# Patient Record
Sex: FEMALE | Race: BLACK OR AFRICAN AMERICAN | NOT HISPANIC OR LATINO | URBAN - METROPOLITAN AREA
[De-identification: names, ages, dates, MRNs, and addresses within clinical notes are randomized per-mention and may not be internally consistent; named-entity substitution may affect disease eponyms.]

---

## 2019-11-06 PROBLEM — 429969003 FAMILY HISTORY OF POLYP OF COLON: Status: ACTIVE | Noted: 2019-11-06

## 2019-11-06 PROBLEM — 428283002 HISTORY OF POLYP OF COLON (SITUATION): Status: ACTIVE | Noted: 2019-11-06

## 2021-08-26 ENCOUNTER — OFFICE VISIT (OUTPATIENT)
Dept: URBAN - METROPOLITAN AREA CLINIC 29 | Facility: CLINIC | Age: 76
End: 2021-08-26

## 2021-08-28 ENCOUNTER — LAB OUTSIDE AN ENCOUNTER (OUTPATIENT)
Dept: URBAN - METROPOLITAN AREA CLINIC 121 | Facility: CLINIC | Age: 76
End: 2021-08-28

## 2021-08-28 LAB — ZZ-GE-UNK: NOT DETECTED

## 2021-09-16 ENCOUNTER — OFFICE VISIT (OUTPATIENT)
Dept: URBAN - METROPOLITAN AREA CLINIC 29 | Facility: CLINIC | Age: 76
End: 2021-09-16

## 2021-10-18 ENCOUNTER — OFFICE VISIT (OUTPATIENT)
Dept: URBAN - METROPOLITAN AREA SURGERY CENTER 7 | Facility: SURGERY CENTER | Age: 76
End: 2021-10-18

## 2021-11-16 ENCOUNTER — OFFICE VISIT (OUTPATIENT)
Dept: URBAN - METROPOLITAN AREA CLINIC 29 | Facility: CLINIC | Age: 76
End: 2021-11-16

## 2021-11-16 PROBLEM — 238131007 OVERWEIGHT: Status: ACTIVE | Noted: 2021-11-16

## 2022-02-10 ENCOUNTER — OFFICE VISIT (OUTPATIENT)
Dept: URBAN - METROPOLITAN AREA TELEHEALTH 2 | Facility: TELEHEALTH | Age: 77
End: 2022-02-10

## 2022-04-30 ENCOUNTER — TELEPHONE ENCOUNTER (OUTPATIENT)
Dept: URBAN - METROPOLITAN AREA CLINIC 121 | Facility: CLINIC | Age: 77
End: 2022-04-30

## 2022-04-30 RX ORDER — PANTOPRAZOLE SODIUM 40 MG/1
TAKE 1 TABLET BY MOUTH ONCE A DAY TABLET, DELAYED RELEASE ORAL
OUTPATIENT
Start: 2021-08-26 | End: 2022-04-23

## 2022-04-30 RX ORDER — PRUCALOPRIDE 2 MG/1
TAKE 1 TABLET BY MOUTH ONCE A DAY TABLET, FILM COATED ORAL
OUTPATIENT
Start: 2021-09-16 | End: 2022-01-14

## 2022-04-30 RX ORDER — LINACLOTIDE 72 UG/1
1 CAPSULE PO QAM 30MIN BEFORE BREAKFAT CAPSULE, GELATIN COATED ORAL
OUTPATIENT
Start: 2020-04-16

## 2022-05-01 ENCOUNTER — TELEPHONE ENCOUNTER (OUTPATIENT)
Dept: URBAN - METROPOLITAN AREA CLINIC 121 | Facility: CLINIC | Age: 77
End: 2022-05-01

## 2022-05-01 RX ORDER — SODIUM SULFATE, POTASSIUM SULFATE, MAGNESIUM SULFATE 17.5; 3.13; 1.6 G/ML; G/ML; G/ML
MIX AND USE AS DIRECTED. MAY SUB FOR GOLYTELY SOLUTION, CONCENTRATE ORAL
Status: ACTIVE | COMMUNITY
Start: 2019-11-06

## 2022-05-01 RX ORDER — LATANOPROST/PF 0.005 %
DROPS OPHTHALMIC (EYE)
Status: ACTIVE | COMMUNITY
Start: 2019-01-16

## 2022-05-01 RX ORDER — TIMOLOL MALEATE 5 MG/1
TABLET ORAL
Status: ACTIVE | COMMUNITY
Start: 2019-01-16

## 2022-05-01 RX ORDER — LOSARTAN POTASSIUM 50 MG/1
TABLET, FILM COATED ORAL
Status: ACTIVE | COMMUNITY

## 2022-05-01 RX ORDER — LACTULOSE 10 G/15ML
TAKE 2 TO 3 TEASPOON BY MOUTH THREE TIMES A DAY SOLUTION ORAL
Status: ACTIVE | COMMUNITY
Start: 2022-02-10 | End: 2022-06-10

## 2022-05-01 RX ORDER — ASCORBIC ACID 500 MG
TABLET,CHEWABLE ORAL
Status: ACTIVE | COMMUNITY
Start: 2015-03-24

## 2022-05-01 RX ORDER — ASPIRIN 81 MG/1
TABLET ORAL
Status: ACTIVE | COMMUNITY
Start: 2015-03-24

## 2022-05-01 RX ORDER — CETIRIZINE HYDROCHLORIDE 10 MG/1
TABLET, FILM COATED ORAL
Status: ACTIVE | COMMUNITY
Start: 2015-03-24

## 2022-05-01 RX ORDER — ATORVASTATIN CALCIUM 10 MG/1
TABLET, FILM COATED ORAL
Status: ACTIVE | COMMUNITY
Start: 2019-01-16

## 2022-05-01 RX ORDER — LINACLOTIDE 72 UG/1
TAKE 1 CAPSULE BY MOUTH EVERY MORNING 30 MINUTES BEFORE BREAKFAST CAPSULE, GELATIN COATED ORAL
Status: ACTIVE | COMMUNITY
Start: 2021-06-07

## 2022-05-01 RX ORDER — DILTIAZEM HYDROCHLORIDE 30 MG/1
TABLET ORAL
Status: ACTIVE | COMMUNITY
Start: 2015-03-24

## 2022-05-01 RX ORDER — POTASSIUM CHLORIDE 600 MG/1
TABLET, FILM COATED, EXTENDED RELEASE ORAL
Status: ACTIVE | COMMUNITY
Start: 2015-03-24

## 2022-05-01 RX ORDER — DORZOLAMIDE HCL/PF 2 %
DROPS OPHTHALMIC (EYE)
Status: ACTIVE | COMMUNITY
Start: 2019-01-16

## 2022-05-01 RX ORDER — FAMOTIDINE 20 MG/1
TABLET ORAL
Status: ACTIVE | COMMUNITY

## 2022-05-01 RX ORDER — CHROMIUM 200 MCG
TABLET ORAL
Status: ACTIVE | COMMUNITY
Start: 2015-03-24

## 2022-06-06 ENCOUNTER — OFFICE VISIT (OUTPATIENT)
Dept: URBAN - METROPOLITAN AREA CLINIC 27 | Facility: CLINIC | Age: 77
End: 2022-06-06
Payer: MEDICARE

## 2022-06-06 DIAGNOSIS — R63.0 ANOREXIA: ICD-10-CM

## 2022-06-06 DIAGNOSIS — R63.4 WEIGHT LOSS: ICD-10-CM

## 2022-06-06 PROCEDURE — 99213 OFFICE O/P EST LOW 20 MIN: CPT | Performed by: INTERNAL MEDICINE

## 2022-06-06 PROCEDURE — 99243 OFF/OP CNSLTJ NEW/EST LOW 30: CPT | Performed by: INTERNAL MEDICINE

## 2022-06-06 NOTE — HPI-TODAY'S VISIT:
The patient was referred by Anahi Carrasco PA-C for unexplained weight loss. A copy of this document is being forwarded to the referring provider. Ms. Cohen reports a 22 pound weight loss over the past year. She has decreased appetite but denies early satiety. She has occasional nausea but denies vomiting, abd pain, diarrhea, constipation. Her colonoscopy last year was significant for a polyp and first degree hemorrhoids. Her last TSH was 0.971. EGD 10/21 was significant for an irregular z-line. Otherwise normal.

## 2022-06-10 ENCOUNTER — CLAIMS CREATED FROM THE CLAIM WINDOW (OUTPATIENT)
Dept: URBAN - METROPOLITAN AREA CLINIC 27 | Facility: CLINIC | Age: 77
End: 2022-06-10
Payer: MEDICARE

## 2022-06-10 ENCOUNTER — WEB ENCOUNTER (OUTPATIENT)
Dept: URBAN - METROPOLITAN AREA CLINIC 27 | Facility: CLINIC | Age: 77
End: 2022-06-10

## 2022-06-10 VITALS
TEMPERATURE: 96.8 F | SYSTOLIC BLOOD PRESSURE: 146 MMHG | DIASTOLIC BLOOD PRESSURE: 76 MMHG | BODY MASS INDEX: 27.99 KG/M2 | HEART RATE: 76 BPM | WEIGHT: 168 LBS | HEIGHT: 65 IN | RESPIRATION RATE: 17 BRPM

## 2022-06-10 DIAGNOSIS — R63.4 WEIGHT LOSS: ICD-10-CM

## 2022-06-10 DIAGNOSIS — R63.0 ANOREXIA: ICD-10-CM

## 2022-06-10 PROCEDURE — 99243 OFF/OP CNSLTJ NEW/EST LOW 30: CPT | Performed by: PHYSICIAN ASSISTANT

## 2022-06-10 PROCEDURE — 99213 OFFICE O/P EST LOW 20 MIN: CPT | Performed by: PHYSICIAN ASSISTANT

## 2022-06-10 RX ORDER — DILTIAZEM HYDROCHLORIDE 30 MG/1
TABLET ORAL
Status: ACTIVE | COMMUNITY
Start: 2015-03-24

## 2022-06-10 RX ORDER — ASPIRIN 81 MG/1
TABLET ORAL
Status: ACTIVE | COMMUNITY
Start: 2015-03-24

## 2022-06-10 RX ORDER — TIMOLOL MALEATE 5 MG/1
TABLET ORAL
Status: ACTIVE | COMMUNITY
Start: 2019-01-16

## 2022-06-10 RX ORDER — SODIUM SULFATE, POTASSIUM SULFATE, MAGNESIUM SULFATE 17.5; 3.13; 1.6 G/ML; G/ML; G/ML
MIX AND USE AS DIRECTED. MAY SUB FOR GOLYTELY SOLUTION, CONCENTRATE ORAL
Status: ACTIVE | COMMUNITY
Start: 2019-11-06

## 2022-06-10 RX ORDER — ATORVASTATIN CALCIUM 10 MG/1
TABLET, FILM COATED ORAL
Status: ACTIVE | COMMUNITY
Start: 2019-01-16

## 2022-06-10 RX ORDER — ASCORBIC ACID 500 MG
TABLET,CHEWABLE ORAL
Status: ACTIVE | COMMUNITY
Start: 2015-03-24

## 2022-06-10 RX ORDER — DORZOLAMIDE HCL/PF 2 %
DROPS OPHTHALMIC (EYE)
Status: ACTIVE | COMMUNITY
Start: 2019-01-16

## 2022-06-10 RX ORDER — LOSARTAN POTASSIUM 50 MG/1
TABLET, FILM COATED ORAL
Status: ACTIVE | COMMUNITY

## 2022-06-10 RX ORDER — LATANOPROST/PF 0.005 %
DROPS OPHTHALMIC (EYE)
Status: ACTIVE | COMMUNITY
Start: 2019-01-16

## 2022-06-10 RX ORDER — CETIRIZINE HYDROCHLORIDE 10 MG/1
TABLET, FILM COATED ORAL
Status: ACTIVE | COMMUNITY
Start: 2015-03-24

## 2022-06-10 RX ORDER — CHROMIUM 200 MCG
TABLET ORAL
Status: ACTIVE | COMMUNITY
Start: 2015-03-24

## 2022-06-10 RX ORDER — LACTULOSE 10 G/15ML
TAKE 2 TO 3 TEASPOON BY MOUTH THREE TIMES A DAY SOLUTION ORAL
Status: ACTIVE | COMMUNITY
Start: 2022-02-10 | End: 2022-06-10

## 2022-06-10 RX ORDER — FAMOTIDINE 20 MG/1
TABLET ORAL
Status: ACTIVE | COMMUNITY

## 2022-06-10 RX ORDER — POTASSIUM CHLORIDE 600 MG/1
TABLET, FILM COATED, EXTENDED RELEASE ORAL
Status: ACTIVE | COMMUNITY
Start: 2015-03-24

## 2022-06-10 RX ORDER — LINACLOTIDE 72 UG/1
TAKE 1 CAPSULE BY MOUTH EVERY MORNING 30 MINUTES BEFORE BREAKFAST CAPSULE, GELATIN COATED ORAL
Status: ACTIVE | COMMUNITY
Start: 2021-06-07

## 2022-06-10 NOTE — HPI-TODAY'S VISIT:
The patient was referred by Anahi Carrasco PA-C for unexplained weight loss. A copy of this document is being forwarded to the referring provider. Ms. Cohen reports a 22 pound weight loss over the past year. She reports a decreased appetite but denies early satiety. She has occasional nausea but denies vomiting, abdominal pain, diarrhea, constipation. Her colonoscopy last year was significant for a colon polyp and hemorrhoids. Her EGD last year was normal except for an irregular Z-line. Her recent TSH was 0.971.

## 2022-06-12 PROBLEM — 79890006: Status: ACTIVE | Noted: 2022-06-12

## 2022-08-10 ENCOUNTER — ERX REFILL RESPONSE (OUTPATIENT)
Dept: URBAN - METROPOLITAN AREA CLINIC 27 | Facility: CLINIC | Age: 77
End: 2022-08-10

## 2022-08-10 RX ORDER — LACTULOSE 10 G/15ML
TAKE 10-15 ML BY MOUTH THREE TIMES DAILY SOLUTION ORAL
Qty: 600 MILLILITER | Refills: 0 | OUTPATIENT

## 2022-08-12 ENCOUNTER — OFFICE VISIT (OUTPATIENT)
Dept: URBAN - METROPOLITAN AREA CLINIC 27 | Facility: CLINIC | Age: 77
End: 2022-08-12
Payer: MEDICARE

## 2022-08-12 VITALS
HEART RATE: 65 BPM | BODY MASS INDEX: 28.32 KG/M2 | WEIGHT: 170 LBS | TEMPERATURE: 96.9 F | SYSTOLIC BLOOD PRESSURE: 125 MMHG | HEIGHT: 65 IN | DIASTOLIC BLOOD PRESSURE: 69 MMHG

## 2022-08-12 DIAGNOSIS — K59.04 CHRONIC IDIOPATHIC CONSTIPATION: ICD-10-CM

## 2022-08-12 DIAGNOSIS — R63.4 WEIGHT LOSS: ICD-10-CM

## 2022-08-12 PROCEDURE — 99213 OFFICE O/P EST LOW 20 MIN: CPT | Performed by: INTERNAL MEDICINE

## 2022-08-12 RX ORDER — TIMOLOL MALEATE 5 MG/1
TABLET ORAL
Status: ACTIVE | COMMUNITY
Start: 2019-01-16

## 2022-08-12 RX ORDER — ASCORBIC ACID 500 MG
TABLET,CHEWABLE ORAL
Status: ACTIVE | COMMUNITY
Start: 2015-03-24

## 2022-08-12 RX ORDER — POTASSIUM CHLORIDE 600 MG/1
TABLET, FILM COATED, EXTENDED RELEASE ORAL
Status: ACTIVE | COMMUNITY
Start: 2015-03-24

## 2022-08-12 RX ORDER — ASPIRIN 81 MG/1
TABLET ORAL
Status: ACTIVE | COMMUNITY
Start: 2015-03-24

## 2022-08-12 RX ORDER — SODIUM SULFATE, POTASSIUM SULFATE, MAGNESIUM SULFATE 17.5; 3.13; 1.6 G/ML; G/ML; G/ML
MIX AND USE AS DIRECTED. MAY SUB FOR GOLYTELY SOLUTION, CONCENTRATE ORAL
Status: ACTIVE | COMMUNITY
Start: 2019-11-06

## 2022-08-12 RX ORDER — LATANOPROST/PF 0.005 %
DROPS OPHTHALMIC (EYE)
Status: ACTIVE | COMMUNITY
Start: 2019-01-16

## 2022-08-12 RX ORDER — DILTIAZEM HYDROCHLORIDE 30 MG/1
TABLET ORAL
Status: ACTIVE | COMMUNITY
Start: 2015-03-24

## 2022-08-12 RX ORDER — LACTULOSE 10 G/15ML
TAKE 10-15 ML BY MOUTH THREE TIMES DAILY SOLUTION ORAL
Qty: 600 MILLILITER | Refills: 0 | Status: ACTIVE | COMMUNITY

## 2022-08-12 RX ORDER — FAMOTIDINE 20 MG/1
TABLET ORAL
Status: ACTIVE | COMMUNITY

## 2022-08-12 RX ORDER — LINACLOTIDE 72 UG/1
TAKE 1 CAPSULE BY MOUTH EVERY MORNING 30 MINUTES BEFORE BREAKFAST CAPSULE, GELATIN COATED ORAL
Status: ACTIVE | COMMUNITY
Start: 2021-06-07

## 2022-08-12 RX ORDER — DORZOLAMIDE HCL/PF 2 %
DROPS OPHTHALMIC (EYE)
Status: ACTIVE | COMMUNITY
Start: 2019-01-16

## 2022-08-12 RX ORDER — LOSARTAN POTASSIUM 50 MG/1
TABLET, FILM COATED ORAL
Status: ACTIVE | COMMUNITY

## 2022-08-12 RX ORDER — ATORVASTATIN CALCIUM 10 MG/1
TABLET, FILM COATED ORAL
Status: ACTIVE | COMMUNITY
Start: 2019-01-16

## 2022-08-12 RX ORDER — CHROMIUM 200 MCG
TABLET ORAL
Status: ACTIVE | COMMUNITY
Start: 2015-03-24

## 2022-08-12 RX ORDER — CETIRIZINE HYDROCHLORIDE 10 MG/1
TABLET, FILM COATED ORAL
Status: ACTIVE | COMMUNITY
Start: 2015-03-24

## 2022-08-12 NOTE — HPI-TODAY'S VISIT:
Mrs. Cohen presents today for follow-up of her weight loss.  During her last visit, she was supposed to get a CT scan of the chest pelvis and abdomen for evaluation of her weight loss.  However, she reports that there was a shortage of IV contrast and that she did not have the study performed.  I also suspected that her weight loss may have been attributed to anxiety or depression and recommended that she follow-up with her primary care physician for that.  She has been prescribed anxiolytic medication and feels somewhat better.  She actually denies any significant medical issues.  Her constipation is being controlled alternating lactulose with MiraLAX.

## 2022-08-16 ENCOUNTER — LAB OUTSIDE AN ENCOUNTER (OUTPATIENT)
Dept: URBAN - METROPOLITAN AREA CLINIC 27 | Facility: CLINIC | Age: 77
End: 2022-08-16

## 2022-11-03 ENCOUNTER — OFFICE VISIT (OUTPATIENT)
Dept: URBAN - METROPOLITAN AREA CLINIC 29 | Facility: CLINIC | Age: 77
End: 2022-11-03
Payer: MEDICARE

## 2022-11-03 VITALS
BODY MASS INDEX: 28.99 KG/M2 | HEART RATE: 68 BPM | DIASTOLIC BLOOD PRESSURE: 85 MMHG | SYSTOLIC BLOOD PRESSURE: 138 MMHG | TEMPERATURE: 96.9 F | WEIGHT: 174 LBS | HEIGHT: 65 IN

## 2022-11-03 DIAGNOSIS — R63.4 WEIGHT LOSS: ICD-10-CM

## 2022-11-03 DIAGNOSIS — K59.04 CHRONIC IDIOPATHIC CONSTIPATION: ICD-10-CM

## 2022-11-03 PROCEDURE — 99212 OFFICE O/P EST SF 10 MIN: CPT | Performed by: INTERNAL MEDICINE

## 2022-11-03 RX ORDER — ASPIRIN 81 MG/1
TABLET ORAL
Status: ACTIVE | COMMUNITY
Start: 2015-03-24

## 2022-11-03 RX ORDER — ATORVASTATIN CALCIUM 10 MG/1
TABLET, FILM COATED ORAL
Status: ACTIVE | COMMUNITY
Start: 2019-01-16

## 2022-11-03 RX ORDER — SODIUM SULFATE, POTASSIUM SULFATE, MAGNESIUM SULFATE 17.5; 3.13; 1.6 G/ML; G/ML; G/ML
MIX AND USE AS DIRECTED. MAY SUB FOR GOLYTELY SOLUTION, CONCENTRATE ORAL
Status: ACTIVE | COMMUNITY
Start: 2019-11-06

## 2022-11-03 RX ORDER — TIMOLOL MALEATE 5 MG/1
TABLET ORAL
Status: ACTIVE | COMMUNITY
Start: 2019-01-16

## 2022-11-03 RX ORDER — CETIRIZINE HYDROCHLORIDE 10 MG/1
TABLET, FILM COATED ORAL
Status: ACTIVE | COMMUNITY
Start: 2015-03-24

## 2022-11-03 RX ORDER — LOSARTAN POTASSIUM 50 MG/1
TABLET, FILM COATED ORAL
Status: ACTIVE | COMMUNITY

## 2022-11-03 RX ORDER — ASCORBIC ACID 500 MG
TABLET,CHEWABLE ORAL
Status: ACTIVE | COMMUNITY
Start: 2015-03-24

## 2022-11-03 RX ORDER — DILTIAZEM HYDROCHLORIDE 30 MG/1
TABLET ORAL
Status: ACTIVE | COMMUNITY
Start: 2015-03-24

## 2022-11-03 RX ORDER — POTASSIUM CHLORIDE 600 MG/1
TABLET, FILM COATED, EXTENDED RELEASE ORAL
Status: ACTIVE | COMMUNITY
Start: 2015-03-24

## 2022-11-03 RX ORDER — FAMOTIDINE 20 MG/1
TABLET ORAL
Status: ACTIVE | COMMUNITY

## 2022-11-03 RX ORDER — CHROMIUM 200 MCG
TABLET ORAL
Status: ACTIVE | COMMUNITY
Start: 2015-03-24

## 2022-11-03 RX ORDER — LINACLOTIDE 72 UG/1
TAKE 1 CAPSULE BY MOUTH EVERY MORNING 30 MINUTES BEFORE BREAKFAST CAPSULE, GELATIN COATED ORAL
Status: ACTIVE | COMMUNITY
Start: 2021-06-07

## 2022-11-03 RX ORDER — LACTULOSE 10 G/15ML
TAKE 10-15 ML BY MOUTH THREE TIMES DAILY SOLUTION ORAL
Qty: 600 MILLILITER | Refills: 0 | Status: ACTIVE | COMMUNITY

## 2022-11-03 RX ORDER — LATANOPROST/PF 0.005 %
DROPS OPHTHALMIC (EYE)
Status: ACTIVE | COMMUNITY
Start: 2019-01-16

## 2022-11-03 RX ORDER — DORZOLAMIDE HCL/PF 2 %
DROPS OPHTHALMIC (EYE)
Status: ACTIVE | COMMUNITY
Start: 2019-01-16

## 2022-11-03 NOTE — HPI-TODAY'S VISIT:
Mrs. Cohen presents today for follow-up of her weight loss. She reports that she has regained most of her weight. Her abdominal CT scan was unremarakble. She believes that her weight loss was attributed to depression. She is now being treated for her depression.  She denies any other GI issues.

## 2022-11-04 PROBLEM — 82934008: Status: ACTIVE | Noted: 2022-08-15

## 2022-11-15 ENCOUNTER — TELEPHONE ENCOUNTER (OUTPATIENT)
Dept: URBAN - METROPOLITAN AREA CLINIC 27 | Facility: CLINIC | Age: 77
End: 2022-11-15

## 2022-11-15 RX ORDER — PANTOPRAZOLE SODIUM 40 MG/1
TAKE 1 TABLET BY MOUTH ONCE A DAY TABLET, DELAYED RELEASE ORAL ONCE A DAY
Qty: 30 | Refills: 3
Start: 2021-08-26

## 2023-01-03 ENCOUNTER — ERX REFILL RESPONSE (OUTPATIENT)
Dept: URBAN - METROPOLITAN AREA CLINIC 27 | Facility: CLINIC | Age: 78
End: 2023-01-03

## 2023-01-03 RX ORDER — LACTULOSE 10 G/15ML
TAKE 10 TO 15 MLS BY MOUTH THREE TIMES DAILY SOLUTION ORAL
Qty: 600 MILLILITER | Refills: 4 | OUTPATIENT

## 2023-01-03 RX ORDER — LACTULOSE 10 G/15ML
TAKE 10-15 ML BY MOUTH THREE TIMES DAILY SOLUTION ORAL
Qty: 600 MILLILITER | Refills: 0 | OUTPATIENT

## 2023-05-10 ENCOUNTER — P2P PATIENT RECORD (OUTPATIENT)
Age: 78
End: 2023-05-10

## 2023-05-15 ENCOUNTER — OFFICE VISIT (OUTPATIENT)
Dept: URBAN - METROPOLITAN AREA CLINIC 27 | Facility: CLINIC | Age: 78
End: 2023-05-15

## 2023-05-30 ENCOUNTER — OFFICE VISIT (OUTPATIENT)
Dept: URBAN - METROPOLITAN AREA CLINIC 27 | Facility: CLINIC | Age: 78
End: 2023-05-30
Payer: MEDICARE

## 2023-05-30 VITALS
WEIGHT: 175 LBS | HEIGHT: 65 IN | DIASTOLIC BLOOD PRESSURE: 70 MMHG | SYSTOLIC BLOOD PRESSURE: 129 MMHG | HEART RATE: 75 BPM | BODY MASS INDEX: 29.16 KG/M2

## 2023-05-30 DIAGNOSIS — K59.01 CONSTIPATION: ICD-10-CM

## 2023-05-30 PROCEDURE — 99213 OFFICE O/P EST LOW 20 MIN: CPT | Performed by: INTERNAL MEDICINE

## 2023-05-30 RX ORDER — DILTIAZEM HYDROCHLORIDE 30 MG/1
TABLET ORAL
Status: ACTIVE | COMMUNITY
Start: 2015-03-24

## 2023-05-30 RX ORDER — CHROMIUM 200 MCG
TABLET ORAL
Status: ACTIVE | COMMUNITY
Start: 2015-03-24

## 2023-05-30 RX ORDER — DORZOLAMIDE HCL/PF 2 %
DROPS OPHTHALMIC (EYE)
Status: ACTIVE | COMMUNITY
Start: 2019-01-16

## 2023-05-30 RX ORDER — LACTULOSE 10 G/15ML
TAKE 10 TO 15 MLS BY MOUTH THREE TIMES DAILY SOLUTION ORAL
Qty: 600 MILLILITER | Refills: 4 | Status: ACTIVE | COMMUNITY

## 2023-05-30 RX ORDER — LATANOPROST/PF 0.005 %
DROPS OPHTHALMIC (EYE)
Status: ACTIVE | COMMUNITY
Start: 2019-01-16

## 2023-05-30 RX ORDER — LOSARTAN POTASSIUM 50 MG/1
TABLET, FILM COATED ORAL
Status: ACTIVE | COMMUNITY

## 2023-05-30 RX ORDER — ASPIRIN 81 MG/1
TABLET ORAL
Status: ACTIVE | COMMUNITY
Start: 2015-03-24

## 2023-05-30 RX ORDER — FAMOTIDINE 20 MG/1
TABLET ORAL
Status: ACTIVE | COMMUNITY

## 2023-05-30 RX ORDER — SODIUM SULFATE, POTASSIUM SULFATE, MAGNESIUM SULFATE 17.5; 3.13; 1.6 G/ML; G/ML; G/ML
MIX AND USE AS DIRECTED. MAY SUB FOR GOLYTELY SOLUTION, CONCENTRATE ORAL
Status: ACTIVE | COMMUNITY
Start: 2019-11-06

## 2023-05-30 RX ORDER — TIMOLOL MALEATE 5 MG/1
TABLET ORAL
Status: ACTIVE | COMMUNITY
Start: 2019-01-16

## 2023-05-30 RX ORDER — PANTOPRAZOLE SODIUM 40 MG/1
TAKE 1 TABLET BY MOUTH ONCE A DAY TABLET, DELAYED RELEASE ORAL ONCE A DAY
Qty: 30 | Refills: 3 | Status: ACTIVE | COMMUNITY
Start: 2021-08-26

## 2023-05-30 RX ORDER — ATORVASTATIN CALCIUM 10 MG/1
TABLET, FILM COATED ORAL
Status: ACTIVE | COMMUNITY
Start: 2019-01-16

## 2023-05-30 RX ORDER — POTASSIUM CHLORIDE 600 MG/1
TABLET, FILM COATED, EXTENDED RELEASE ORAL
Status: ACTIVE | COMMUNITY
Start: 2015-03-24

## 2023-05-30 RX ORDER — LINACLOTIDE 72 UG/1
TAKE 1 CAPSULE BY MOUTH EVERY MORNING 30 MINUTES BEFORE BREAKFAST CAPSULE, GELATIN COATED ORAL
Status: ACTIVE | COMMUNITY
Start: 2021-06-07

## 2023-05-30 RX ORDER — CETIRIZINE HYDROCHLORIDE 10 MG/1
TABLET, FILM COATED ORAL
Status: ACTIVE | COMMUNITY
Start: 2015-03-24

## 2023-05-30 RX ORDER — ASCORBIC ACID 500 MG
TABLET,CHEWABLE ORAL
Status: ACTIVE | COMMUNITY
Start: 2015-03-24

## 2023-05-30 NOTE — HPI-TODAY'S VISIT:
Mrs. Cohen presents today for routine follow-up of her chronic constipation.  She is currently taking Lactulose at least 3 times daily.  She reports this is the best regimen and the most economical for her to use right now.  She has previously tried other agents such as Linzess, Amitiza, etc. which were too expensive.  Otherwise, she has no other GI complaints.

## 2023-08-03 ENCOUNTER — OFFICE VISIT (OUTPATIENT)
Dept: URBAN - METROPOLITAN AREA CLINIC 29 | Facility: CLINIC | Age: 78
End: 2023-08-03
Payer: MEDICARE

## 2023-08-03 VITALS
DIASTOLIC BLOOD PRESSURE: 82 MMHG | HEART RATE: 70 BPM | HEIGHT: 65 IN | SYSTOLIC BLOOD PRESSURE: 144 MMHG | WEIGHT: 175 LBS | BODY MASS INDEX: 29.16 KG/M2 | RESPIRATION RATE: 17 BRPM

## 2023-08-03 DIAGNOSIS — K30 FUNCTIONAL DYSPEPSIA: ICD-10-CM

## 2023-08-03 PROCEDURE — 99213 OFFICE O/P EST LOW 20 MIN: CPT | Performed by: INTERNAL MEDICINE

## 2023-08-03 RX ORDER — LATANOPROST/PF 0.005 %
DROPS OPHTHALMIC (EYE)
Status: ACTIVE | COMMUNITY
Start: 2019-01-16

## 2023-08-03 RX ORDER — CETIRIZINE HYDROCHLORIDE 10 MG/1
TABLET, FILM COATED ORAL
Status: ACTIVE | COMMUNITY
Start: 2015-03-24

## 2023-08-03 RX ORDER — ASPIRIN 81 MG/1
TABLET ORAL
Status: ACTIVE | COMMUNITY
Start: 2015-03-24

## 2023-08-03 RX ORDER — POTASSIUM CHLORIDE 600 MG/1
TABLET, FILM COATED, EXTENDED RELEASE ORAL
Status: ACTIVE | COMMUNITY
Start: 2015-03-24

## 2023-08-03 RX ORDER — PANTOPRAZOLE SODIUM 40 MG/1
TAKE 1 TABLET BY MOUTH ONCE A DAY TABLET, DELAYED RELEASE ORAL ONCE A DAY
Qty: 30 | Refills: 3 | Status: ACTIVE | COMMUNITY
Start: 2021-08-26

## 2023-08-03 RX ORDER — ATORVASTATIN CALCIUM 10 MG/1
TABLET, FILM COATED ORAL
Status: ACTIVE | COMMUNITY
Start: 2019-01-16

## 2023-08-03 RX ORDER — FAMOTIDINE 20 MG/1
TABLET ORAL
Status: ACTIVE | COMMUNITY

## 2023-08-03 RX ORDER — LOSARTAN POTASSIUM 50 MG/1
TABLET, FILM COATED ORAL
Status: ACTIVE | COMMUNITY

## 2023-08-03 RX ORDER — LACTULOSE 10 G/15ML
TAKE 10 TO 15 MLS BY MOUTH THREE TIMES DAILY SOLUTION ORAL
Qty: 600 MILLILITER | Refills: 4 | Status: ACTIVE | COMMUNITY

## 2023-08-03 RX ORDER — LINACLOTIDE 72 UG/1
TAKE 1 CAPSULE BY MOUTH EVERY MORNING 30 MINUTES BEFORE BREAKFAST CAPSULE, GELATIN COATED ORAL
Status: ACTIVE | COMMUNITY
Start: 2021-06-07

## 2023-08-03 RX ORDER — TIMOLOL MALEATE 5 MG/1
TABLET ORAL
Status: ACTIVE | COMMUNITY
Start: 2019-01-16

## 2023-08-03 RX ORDER — ASCORBIC ACID 500 MG
TABLET,CHEWABLE ORAL
Status: ACTIVE | COMMUNITY
Start: 2015-03-24

## 2023-08-03 RX ORDER — DORZOLAMIDE HCL/PF 2 %
DROPS OPHTHALMIC (EYE)
Status: ACTIVE | COMMUNITY
Start: 2019-01-16

## 2023-08-03 RX ORDER — DILTIAZEM HYDROCHLORIDE 30 MG/1
TABLET ORAL
Status: ACTIVE | COMMUNITY
Start: 2015-03-24

## 2023-08-03 RX ORDER — SODIUM SULFATE, POTASSIUM SULFATE, MAGNESIUM SULFATE 17.5; 3.13; 1.6 G/ML; G/ML; G/ML
MIX AND USE AS DIRECTED. MAY SUB FOR GOLYTELY SOLUTION, CONCENTRATE ORAL
Status: ACTIVE | COMMUNITY
Start: 2019-11-06

## 2023-08-03 RX ORDER — CHROMIUM 200 MCG
TABLET ORAL
Status: ACTIVE | COMMUNITY
Start: 2015-03-24

## 2023-08-03 NOTE — HPI-TODAY'S VISIT:
Mrs. Cohen presents today with complaints of the acute onset of severe mid abdominal discomfort which began a few days ago.  She reports that she took Gaviscon as well as chamomile tea and began to feel better a couple days later.  Today she feels fine and has no complaints.  She was unsure what was going on at the time and decided to make a visit.

## 2023-10-06 ENCOUNTER — TELEPHONE ENCOUNTER (OUTPATIENT)
Dept: URBAN - METROPOLITAN AREA CLINIC 27 | Facility: CLINIC | Age: 78
End: 2023-10-06

## 2023-10-06 RX ORDER — LACTULOSE 10 G/15ML
TAKE 10 TO 15 MLS BY MOUTH THREE TIMES DAILY SOLUTION ORAL
Qty: 600 MILLILITER | Refills: 4

## 2023-10-17 ENCOUNTER — TELEPHONE ENCOUNTER (OUTPATIENT)
Dept: URBAN - METROPOLITAN AREA CLINIC 27 | Facility: CLINIC | Age: 78
End: 2023-10-17

## 2023-10-19 ENCOUNTER — TELEPHONE ENCOUNTER (OUTPATIENT)
Dept: URBAN - METROPOLITAN AREA CLINIC 27 | Facility: CLINIC | Age: 78
End: 2023-10-19

## 2023-10-19 RX ORDER — PANTOPRAZOLE SODIUM 40 MG/1
TAKE 1 TABLET BY MOUTH ONCE A DAY TABLET, DELAYED RELEASE ORAL ONCE A DAY
Qty: 30 | Refills: 3
Start: 2021-08-26

## 2024-01-10 ENCOUNTER — TELEPHONE ENCOUNTER (OUTPATIENT)
Dept: URBAN - METROPOLITAN AREA CLINIC 27 | Facility: CLINIC | Age: 79
End: 2024-01-10

## 2024-01-10 RX ORDER — PANTOPRAZOLE SODIUM 40 MG/1
1 TABLET TABLET, DELAYED RELEASE ORAL ONCE A DAY
Qty: 60 TABLET | Refills: 3 | OUTPATIENT
Start: 2024-01-11

## 2024-01-11 ENCOUNTER — ERX REFILL RESPONSE (OUTPATIENT)
Dept: URBAN - METROPOLITAN AREA CLINIC 27 | Facility: CLINIC | Age: 79
End: 2024-01-11

## 2024-01-11 RX ORDER — LACTULOSE 10 G/15ML
TAKE 10 TO 15 MLS BY MOUTH THREE TIMES DAILY SOLUTION ORAL
Qty: 600 MILLILITER | Refills: 4 | OUTPATIENT

## 2024-01-11 RX ORDER — LACTULOSE 10 G/15ML
TAKE 10 TO 15 ML BY MOUTH THREE TIMES DAILY SOLUTION ORAL; RECTAL
Qty: 600 MILLILITER | Refills: 4 | OUTPATIENT

## 2024-03-19 ENCOUNTER — OV EP (OUTPATIENT)
Dept: URBAN - METROPOLITAN AREA CLINIC 27 | Facility: CLINIC | Age: 79
End: 2024-03-19
Payer: MEDICARE

## 2024-03-19 ENCOUNTER — LAB (OUTPATIENT)
Dept: URBAN - METROPOLITAN AREA CLINIC 27 | Facility: CLINIC | Age: 79
End: 2024-03-19

## 2024-03-19 VITALS
BODY MASS INDEX: 29.16 KG/M2 | HEART RATE: 62 BPM | SYSTOLIC BLOOD PRESSURE: 130 MMHG | WEIGHT: 175 LBS | HEIGHT: 65 IN | DIASTOLIC BLOOD PRESSURE: 82 MMHG

## 2024-03-19 DIAGNOSIS — Z86.010 PERSONAL HISTORY OF COLONIC POLYPS: ICD-10-CM

## 2024-03-19 DIAGNOSIS — K59.00 CONSTIPATION, UNSPECIFIED: ICD-10-CM

## 2024-03-19 DIAGNOSIS — I10 ESSENTIAL (PRIMARY) HYPERTENSION: ICD-10-CM

## 2024-03-19 DIAGNOSIS — R12 HEARTBURN: ICD-10-CM

## 2024-03-19 PROBLEM — 16331000: Status: ACTIVE | Noted: 2024-03-19

## 2024-03-19 PROCEDURE — 99214 OFFICE O/P EST MOD 30 MIN: CPT | Performed by: INTERNAL MEDICINE

## 2024-03-19 RX ORDER — PANTOPRAZOLE SODIUM 40 MG/1
1 TABLET TABLET, DELAYED RELEASE ORAL ONCE A DAY
Qty: 60 TABLET | Refills: 3 | Status: ACTIVE | COMMUNITY
Start: 2024-01-11

## 2024-03-19 RX ORDER — CETIRIZINE HYDROCHLORIDE 10 MG/1
TABLET, FILM COATED ORAL
Status: ACTIVE | COMMUNITY
Start: 2015-03-24

## 2024-03-19 RX ORDER — ASCORBIC ACID 500 MG
TABLET,CHEWABLE ORAL
Status: ACTIVE | COMMUNITY
Start: 2015-03-24

## 2024-03-19 RX ORDER — TIMOLOL MALEATE 5 MG/1
TABLET ORAL
Status: ACTIVE | COMMUNITY
Start: 2019-01-16

## 2024-03-19 RX ORDER — LACTULOSE SOLUTION USP, 10 G/15 ML 10 G/15ML
TAKE 10 TO 15 ML BY MOUTH THREE TIMES DAILY SOLUTION ORAL; RECTAL
Qty: 600 MILLILITER | Refills: 4 | Status: ACTIVE | COMMUNITY

## 2024-03-19 RX ORDER — LATANOPROST/PF 0.005 %
DROPS OPHTHALMIC (EYE)
Status: ACTIVE | COMMUNITY
Start: 2019-01-16

## 2024-03-19 RX ORDER — DILTIAZEM HYDROCHLORIDE 30 MG/1
TABLET ORAL
Status: ACTIVE | COMMUNITY
Start: 2015-03-24

## 2024-03-19 RX ORDER — SODIUM SULFATE, POTASSIUM SULFATE, MAGNESIUM SULFATE 17.5; 3.13; 1.6 G/ML; G/ML; G/ML
MIX AND USE AS DIRECTED. MAY SUB FOR GOLYTELY SOLUTION, CONCENTRATE ORAL
Status: ACTIVE | COMMUNITY
Start: 2019-11-06

## 2024-03-19 RX ORDER — LINACLOTIDE 72 UG/1
TAKE 1 CAPSULE BY MOUTH EVERY MORNING 30 MINUTES BEFORE BREAKFAST CAPSULE, GELATIN COATED ORAL
Status: ACTIVE | COMMUNITY
Start: 2021-06-07

## 2024-03-19 RX ORDER — DORZOLAMIDE HCL/PF 2 %
DROPS OPHTHALMIC (EYE)
Status: ACTIVE | COMMUNITY
Start: 2019-01-16

## 2024-03-19 RX ORDER — FAMOTIDINE 20 MG/1
TABLET ORAL
Status: ACTIVE | COMMUNITY

## 2024-03-19 RX ORDER — PANTOPRAZOLE SODIUM 40 MG/1
TAKE 1 TABLET BY MOUTH ONCE A DAY TABLET, DELAYED RELEASE ORAL ONCE A DAY
Qty: 30 | Refills: 3 | Status: ACTIVE | COMMUNITY
Start: 2021-08-26

## 2024-03-19 RX ORDER — SOD SULF/POT CHLORIDE/MAG SULF 1.479 G
12 TABLETS THE FIRST DOSE THE EVENING BEFORE AND SECOND DOSE THE MORNING OF COLONOSCOPY TABLET ORAL TWICE A DAY
Qty: 24 TABLET | OUTPATIENT
Start: 2024-03-19

## 2024-03-19 RX ORDER — POTASSIUM CHLORIDE 600 MG/1
TABLET, FILM COATED, EXTENDED RELEASE ORAL
Status: ACTIVE | COMMUNITY
Start: 2015-03-24

## 2024-03-19 RX ORDER — ATORVASTATIN CALCIUM 10 MG/1
TABLET, FILM COATED ORAL
Status: ACTIVE | COMMUNITY
Start: 2019-01-16

## 2024-03-19 RX ORDER — LOSARTAN POTASSIUM 50 MG/1
TABLET, FILM COATED ORAL
Status: ACTIVE | COMMUNITY

## 2024-03-19 RX ORDER — CHROMIUM 200 MCG
TABLET ORAL
Status: ACTIVE | COMMUNITY
Start: 2015-03-24

## 2024-03-19 RX ORDER — ASPIRIN 81 MG/1
TABLET ORAL
Status: ACTIVE | COMMUNITY
Start: 2015-03-24

## 2024-03-19 NOTE — HPI-TODAY'S VISIT:
Mrs. Cohen presents today for colon polyp surveillance.  Last colonoscopy in 2020 was notable for pain colon biopsy.  She has a history of adenomatous polyps.  She denies any family history of colon cancer.  She takes lactulose daily for her chronic constipation which works well.  Lately, she has been having some infrequent episodes of heartburn and is not on any prescribed medication.  Otherwise, she has no other GI complaints.

## 2024-05-29 ENCOUNTER — OFFICE VISIT (OUTPATIENT)
Dept: URBAN - METROPOLITAN AREA MEDICAL CENTER 8 | Facility: MEDICAL CENTER | Age: 79
End: 2024-05-29

## 2024-05-29 RX ORDER — ASPIRIN 81 MG/1
TABLET ORAL
Status: ACTIVE | COMMUNITY
Start: 2015-03-24

## 2024-05-29 RX ORDER — LINACLOTIDE 72 UG/1
TAKE 1 CAPSULE BY MOUTH EVERY MORNING 30 MINUTES BEFORE BREAKFAST CAPSULE, GELATIN COATED ORAL
Status: ACTIVE | COMMUNITY
Start: 2021-06-07

## 2024-05-29 RX ORDER — SODIUM SULFATE, POTASSIUM SULFATE, MAGNESIUM SULFATE 17.5; 3.13; 1.6 G/ML; G/ML; G/ML
MIX AND USE AS DIRECTED. MAY SUB FOR GOLYTELY SOLUTION, CONCENTRATE ORAL
Status: ACTIVE | COMMUNITY
Start: 2019-11-06

## 2024-05-29 RX ORDER — CETIRIZINE HYDROCHLORIDE 10 MG/1
TABLET, FILM COATED ORAL
Status: ACTIVE | COMMUNITY
Start: 2015-03-24

## 2024-05-29 RX ORDER — DILTIAZEM HYDROCHLORIDE 30 MG/1
TABLET ORAL
Status: ACTIVE | COMMUNITY
Start: 2015-03-24

## 2024-05-29 RX ORDER — POTASSIUM CHLORIDE 600 MG/1
TABLET, FILM COATED, EXTENDED RELEASE ORAL
Status: ACTIVE | COMMUNITY
Start: 2015-03-24

## 2024-05-29 RX ORDER — ASCORBIC ACID 500 MG
TABLET,CHEWABLE ORAL
Status: ACTIVE | COMMUNITY
Start: 2015-03-24

## 2024-05-29 RX ORDER — CHROMIUM 200 MCG
TABLET ORAL
Status: ACTIVE | COMMUNITY
Start: 2015-03-24

## 2024-05-29 RX ORDER — PANTOPRAZOLE SODIUM 40 MG/1
TAKE 1 TABLET BY MOUTH ONCE A DAY TABLET, DELAYED RELEASE ORAL ONCE A DAY
Qty: 30 | Refills: 3 | Status: ACTIVE | COMMUNITY
Start: 2021-08-26

## 2024-05-29 RX ORDER — LATANOPROST/PF 0.005 %
DROPS OPHTHALMIC (EYE)
Status: ACTIVE | COMMUNITY
Start: 2019-01-16

## 2024-05-29 RX ORDER — ATORVASTATIN CALCIUM 10 MG/1
TABLET, FILM COATED ORAL
Status: ACTIVE | COMMUNITY
Start: 2019-01-16

## 2024-05-29 RX ORDER — TIMOLOL MALEATE 5 MG/1
TABLET ORAL
Status: ACTIVE | COMMUNITY
Start: 2019-01-16

## 2024-05-29 RX ORDER — LOSARTAN POTASSIUM 50 MG/1
TABLET, FILM COATED ORAL
Status: ACTIVE | COMMUNITY

## 2024-05-29 RX ORDER — SOD SULF/POT CHLORIDE/MAG SULF 1.479 G
12 TABLETS THE FIRST DOSE THE EVENING BEFORE AND SECOND DOSE THE MORNING OF COLONOSCOPY TABLET ORAL TWICE A DAY
Qty: 24 TABLET | Status: ACTIVE | COMMUNITY
Start: 2024-03-19

## 2024-05-29 RX ORDER — FAMOTIDINE 20 MG/1
TABLET ORAL
Status: ACTIVE | COMMUNITY

## 2024-05-29 RX ORDER — DORZOLAMIDE HCL/PF 2 %
DROPS OPHTHALMIC (EYE)
Status: ACTIVE | COMMUNITY
Start: 2019-01-16

## 2024-05-29 RX ORDER — PANTOPRAZOLE SODIUM 40 MG/1
1 TABLET TABLET, DELAYED RELEASE ORAL ONCE A DAY
Qty: 60 TABLET | Refills: 3 | Status: ACTIVE | COMMUNITY
Start: 2024-01-11

## 2024-05-29 RX ORDER — LACTULOSE SOLUTION USP, 10 G/15 ML 10 G/15ML
TAKE 10 TO 15 ML BY MOUTH THREE TIMES DAILY SOLUTION ORAL; RECTAL
Qty: 600 MILLILITER | Refills: 4 | Status: ACTIVE | COMMUNITY

## 2024-06-13 ENCOUNTER — TELEPHONE ENCOUNTER (OUTPATIENT)
Dept: URBAN - METROPOLITAN AREA CLINIC 27 | Facility: CLINIC | Age: 79
End: 2024-06-13

## 2024-09-11 ENCOUNTER — ERX REFILL RESPONSE (OUTPATIENT)
Dept: URBAN - METROPOLITAN AREA CLINIC 27 | Facility: CLINIC | Age: 79
End: 2024-09-11

## 2024-09-11 RX ORDER — LACTULOSE SOLUTION USP, 10 G/15 ML 10 G/15ML
TAKE 10 TO 15 ML BY MOUTH THREE TIMES DAILY SOLUTION ORAL; RECTAL
Qty: 600 MILLILITER | Refills: 0 | OUTPATIENT

## 2024-09-11 RX ORDER — LACTULOSE SOLUTION USP, 10 G/15 ML 10 G/15ML
TAKE 10 TO 15 ML BY MOUTH THREE TIMES DAILY SOLUTION ORAL; RECTAL
Qty: 600 MILLILITER | Refills: 4 | OUTPATIENT

## 2025-01-08 ENCOUNTER — OFFICE VISIT (OUTPATIENT)
Dept: URBAN - METROPOLITAN AREA CLINIC 27 | Facility: CLINIC | Age: 80
End: 2025-01-08
Payer: COMMERCIAL

## 2025-01-08 ENCOUNTER — DASHBOARD ENCOUNTERS (OUTPATIENT)
Age: 80
End: 2025-01-08

## 2025-01-08 VITALS
HEART RATE: 62 BPM | BODY MASS INDEX: 29.99 KG/M2 | WEIGHT: 180 LBS | DIASTOLIC BLOOD PRESSURE: 71 MMHG | SYSTOLIC BLOOD PRESSURE: 125 MMHG | HEIGHT: 65 IN

## 2025-01-08 DIAGNOSIS — K59.00 CONSTIPATION, UNSPECIFIED: ICD-10-CM

## 2025-01-08 DIAGNOSIS — R12 HEARTBURN: ICD-10-CM

## 2025-01-08 PROCEDURE — 99214 OFFICE O/P EST MOD 30 MIN: CPT | Performed by: INTERNAL MEDICINE

## 2025-01-08 RX ORDER — LACTULOSE SOLUTION USP, 10 G/15 ML 10 G/15ML
TAKE 10 TO 15 ML BY MOUTH THREE TIMES DAILY SOLUTION ORAL; RECTAL
Qty: 600 MILLILITER | Refills: 4 | Status: ACTIVE | COMMUNITY

## 2025-01-08 RX ORDER — SOD SULF/POT CHLORIDE/MAG SULF 1.479 G
12 TABLETS THE FIRST DOSE THE EVENING BEFORE AND SECOND DOSE THE MORNING OF COLONOSCOPY TABLET ORAL TWICE A DAY
Qty: 24 TABLET | Status: ACTIVE | COMMUNITY
Start: 2024-03-19

## 2025-01-08 RX ORDER — LINACLOTIDE 72 UG/1
TAKE 1 CAPSULE BY MOUTH EVERY MORNING 30 MINUTES BEFORE BREAKFAST CAPSULE, GELATIN COATED ORAL
Status: ACTIVE | COMMUNITY
Start: 2021-06-07

## 2025-01-08 RX ORDER — DILTIAZEM HYDROCHLORIDE 30 MG/1
TABLET ORAL
Status: ACTIVE | COMMUNITY
Start: 2015-03-24

## 2025-01-08 RX ORDER — PANTOPRAZOLE SODIUM 40 MG/1
TAKE 1 TABLET BY MOUTH ONCE A DAY TABLET, DELAYED RELEASE ORAL ONCE A DAY
Qty: 30 | Refills: 3 | Status: ACTIVE | COMMUNITY
Start: 2021-08-26

## 2025-01-08 RX ORDER — SODIUM SULFATE, POTASSIUM SULFATE, MAGNESIUM SULFATE 17.5; 3.13; 1.6 G/ML; G/ML; G/ML
MIX AND USE AS DIRECTED. MAY SUB FOR GOLYTELY SOLUTION, CONCENTRATE ORAL
Status: ACTIVE | COMMUNITY
Start: 2019-11-06

## 2025-01-08 RX ORDER — FAMOTIDINE 20 MG/1
TABLET ORAL
Status: ACTIVE | COMMUNITY

## 2025-01-08 RX ORDER — ATORVASTATIN CALCIUM 10 MG/1
TABLET, FILM COATED ORAL
Status: ACTIVE | COMMUNITY
Start: 2019-01-16

## 2025-01-08 RX ORDER — LATANOPROST/PF 0.005 %
DROPS OPHTHALMIC (EYE)
Status: ACTIVE | COMMUNITY
Start: 2019-01-16

## 2025-01-08 RX ORDER — DORZOLAMIDE HCL/PF 2 %
DROPS OPHTHALMIC (EYE)
Status: ACTIVE | COMMUNITY
Start: 2019-01-16

## 2025-01-08 RX ORDER — PANTOPRAZOLE SODIUM 40 MG/1
1 TABLET TABLET, DELAYED RELEASE ORAL ONCE A DAY
Qty: 60 TABLET | Refills: 3 | Status: ACTIVE | COMMUNITY
Start: 2024-01-11

## 2025-01-08 RX ORDER — ASCORBIC ACID 500 MG
TABLET,CHEWABLE ORAL
Status: ACTIVE | COMMUNITY
Start: 2015-03-24

## 2025-01-08 RX ORDER — TIMOLOL MALEATE 5 MG/1
TABLET ORAL
Status: ACTIVE | COMMUNITY
Start: 2019-01-16

## 2025-01-08 RX ORDER — ASPIRIN 81 MG/1
TABLET ORAL
Status: ACTIVE | COMMUNITY
Start: 2015-03-24

## 2025-01-08 RX ORDER — CHROMIUM 200 MCG
TABLET ORAL
Status: ACTIVE | COMMUNITY
Start: 2015-03-24

## 2025-01-08 RX ORDER — LOSARTAN POTASSIUM 50 MG/1
TABLET, FILM COATED ORAL
Status: ACTIVE | COMMUNITY

## 2025-01-08 RX ORDER — CETIRIZINE HYDROCHLORIDE 10 MG/1
TABLET, FILM COATED ORAL
Status: ACTIVE | COMMUNITY
Start: 2015-03-24

## 2025-01-08 RX ORDER — POTASSIUM CHLORIDE 600 MG/1
TABLET, FILM COATED, EXTENDED RELEASE ORAL
Status: ACTIVE | COMMUNITY
Start: 2015-03-24

## 2025-01-08 NOTE — HPI-TODAY'S VISIT:
Mrs. Cohen presents today for routine follow-up of her chronic constipation and heartburn.  I performed her last colonoscopy in May 2024 which was notable for a tubular adenoma and a inflammatory polyp.  As far as her constipation, she reports significant improvement of her bowel movements taking Lactulose 15 mL 3 times daily.  Lastly, her heartburn symptoms are well-controlled taking Pantoprazole 40 mg daily.  Otherwise, she has no other GI complaints.

## 2025-01-13 ENCOUNTER — ERX REFILL RESPONSE (OUTPATIENT)
Dept: URBAN - METROPOLITAN AREA CLINIC 27 | Facility: CLINIC | Age: 80
End: 2025-01-13

## 2025-01-13 RX ORDER — PANTOPRAZOLE SODIUM 40 MG/1
1 TABLET TABLET, DELAYED RELEASE ORAL ONCE A DAY
Qty: 60 TABLET | Refills: 3 | OUTPATIENT

## 2025-01-13 RX ORDER — PANTOPRAZOLE SODIUM 40 MG/1
TAKE 1 TABLET BY MOUTH EVERY DAY TABLET, DELAYED RELEASE ORAL
Qty: 60 TABLET | Refills: 4 | OUTPATIENT

## 2025-02-28 ENCOUNTER — TELEPHONE ENCOUNTER (OUTPATIENT)
Dept: URBAN - METROPOLITAN AREA CLINIC 27 | Facility: CLINIC | Age: 80
End: 2025-02-28

## 2025-03-03 ENCOUNTER — ERX REFILL RESPONSE (OUTPATIENT)
Dept: URBAN - METROPOLITAN AREA CLINIC 27 | Facility: CLINIC | Age: 80
End: 2025-03-03

## 2025-03-03 RX ORDER — LACTULOSE SOLUTION USP, 10 G/15 ML 10 G/15ML
TAKE 10 TO 15 ML BY MOUTH THREE TIMES DAILY SOLUTION ORAL; RECTAL
Qty: 600 MILLILITER | Refills: 4 | OUTPATIENT

## 2025-07-23 ENCOUNTER — ERX REFILL RESPONSE (OUTPATIENT)
Dept: URBAN - METROPOLITAN AREA CLINIC 27 | Facility: CLINIC | Age: 80
End: 2025-07-23

## 2025-07-23 RX ORDER — LACTULOSE 10 G/15ML
TAKE 10-15 ML BY MOUTH THREE TIMES DAILY SOLUTION ORAL
Qty: 4153 MILLILITER | Refills: 0 | OUTPATIENT

## 2025-07-23 RX ORDER — LACTULOSE SOLUTION USP, 10 G/15 ML 10 G/15ML
TAKE 10 TO 15 ML BY MOUTH THREE TIMES DAILY SOLUTION ORAL; RECTAL
Qty: 600 MILLILITER | Refills: 4 | OUTPATIENT

## 2025-08-04 ENCOUNTER — ERX REFILL RESPONSE (OUTPATIENT)
Dept: URBAN - METROPOLITAN AREA CLINIC 27 | Facility: CLINIC | Age: 80
End: 2025-08-04

## 2025-08-04 RX ORDER — LACTULOSE 10 G/15ML
TAKE 10-15 ML BY MOUTH THREE TIMES DAILY SOLUTION ORAL
Qty: 4153 MILLILITER | Refills: 4 | OUTPATIENT